# Patient Record
Sex: MALE | Race: WHITE | NOT HISPANIC OR LATINO | Employment: STUDENT | URBAN - NONMETROPOLITAN AREA
[De-identification: names, ages, dates, MRNs, and addresses within clinical notes are randomized per-mention and may not be internally consistent; named-entity substitution may affect disease eponyms.]

---
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
## 2025-01-28 ENCOUNTER — OFFICE VISIT (OUTPATIENT)
Dept: FAMILY MEDICINE | Facility: CLINIC | Age: 20
End: 2025-01-28
Payer: COMMERCIAL

## 2025-01-28 VITALS
SYSTOLIC BLOOD PRESSURE: 129 MMHG | BODY MASS INDEX: 29.54 KG/M2 | HEIGHT: 71 IN | DIASTOLIC BLOOD PRESSURE: 70 MMHG | RESPIRATION RATE: 20 BRPM | WEIGHT: 211 LBS | OXYGEN SATURATION: 97 % | HEART RATE: 76 BPM | TEMPERATURE: 99 F

## 2025-01-28 DIAGNOSIS — F41.1 GENERALIZED ANXIETY DISORDER: ICD-10-CM

## 2025-01-28 PROCEDURE — 1160F RVW MEDS BY RX/DR IN RCRD: CPT | Mod: CPTII,,, | Performed by: NURSE PRACTITIONER

## 2025-01-28 PROCEDURE — 3074F SYST BP LT 130 MM HG: CPT | Mod: CPTII,,, | Performed by: NURSE PRACTITIONER

## 2025-01-28 PROCEDURE — 99213 OFFICE O/P EST LOW 20 MIN: CPT | Mod: ,,, | Performed by: NURSE PRACTITIONER

## 2025-01-28 PROCEDURE — 3008F BODY MASS INDEX DOCD: CPT | Mod: CPTII,,, | Performed by: NURSE PRACTITIONER

## 2025-01-28 PROCEDURE — 3078F DIAST BP <80 MM HG: CPT | Mod: CPTII,,, | Performed by: NURSE PRACTITIONER

## 2025-01-28 PROCEDURE — 1159F MED LIST DOCD IN RCRD: CPT | Mod: CPTII,,, | Performed by: NURSE PRACTITIONER

## 2025-01-28 RX ORDER — CITALOPRAM 20 MG/1
20 TABLET, FILM COATED ORAL DAILY
Qty: 30 TABLET | Refills: 1 | Status: SHIPPED | OUTPATIENT
Start: 2025-01-28

## 2025-01-28 NOTE — PROGRESS NOTES
"Patient ID: Kodak Gentile  : 2005     Chief Complaint: Follow-up (On medications)    Allergies: Patient has No Known Allergies.     History of Present Illness:  The patient is a 19 y.o. White male who presents to clinic for evaluation and management with a chief complaint of Follow-up (On medications)   HPI   Patient complains of increased anxiety while on celexa in past 6 weeks. He was feeling medication working well until then when he started becoming more irritable and aggravated at home, work and school. He is attending school during day, then working from 4-10 5-6 days week.     Past Medical History:  has a past medical history of Obesity, unspecified.    Social History:  reports that he quit smoking about 15 months ago. His smoking use included cigarettes and vaping with nicotine. He started smoking about 3 months ago. He has never been exposed to tobacco smoke. He has never used smokeless tobacco. He reports that he does not drink alcohol and does not use drugs.    Care Team: Patient Care Team:  Sunitha Shah DNP as PCP - General (Family Medicine)     Current Medications:  Current Outpatient Medications   Medication Instructions    citalopram (CELEXA) 20 mg, Oral, Daily, Dose increase    hydrOXYzine (ATARAX) 50 mg, Oral, Nightly PRN       Review of Systems   Psychiatric/Behavioral:  Positive for agitation, dysphoric mood and sleep disturbance. Negative for behavioral problems, decreased concentration, depressed mood, self-injury and suicidal ideas. The patient is nervous/anxious.    All other systems reviewed and are negative.       Visit Vitals  /70 (BP Location: Left arm, Patient Position: Sitting)   Pulse 76   Temp 99.1 °F (37.3 °C)   Resp 20   Ht 5' 11" (1.803 m)   Wt 95.7 kg (211 lb)   SpO2 97%   BMI 29.43 kg/m²       Physical Exam  Vitals and nursing note reviewed.   Constitutional:       General: He is not in acute distress.     Appearance: He is not ill-appearing.   HENT:      Head: " "Normocephalic and atraumatic.      Mouth/Throat:      Mouth: Mucous membranes are moist.      Pharynx: Oropharynx is clear.   Eyes:      General: No scleral icterus.     Extraocular Movements: Extraocular movements intact.      Conjunctiva/sclera: Conjunctivae normal.      Pupils: Pupils are equal, round, and reactive to light.   Neck:      Vascular: No carotid bruit.   Cardiovascular:      Rate and Rhythm: Normal rate and regular rhythm.      Pulses: Normal pulses.      Heart sounds: Normal heart sounds. No murmur heard.     No friction rub. No gallop.   Pulmonary:      Effort: Pulmonary effort is normal. No respiratory distress.      Breath sounds: Normal breath sounds. No wheezing, rhonchi or rales.   Abdominal:      General: Abdomen is flat. Bowel sounds are normal. There is no distension.      Palpations: Abdomen is soft. There is no mass.      Tenderness: There is no abdominal tenderness.   Musculoskeletal:         General: Normal range of motion.      Cervical back: Normal range of motion and neck supple.   Skin:     General: Skin is warm and dry.   Neurological:      General: No focal deficit present.      Mental Status: He is alert and oriented to person, place, and time.   Psychiatric:         Mood and Affect: Mood normal.         Behavior: Behavior normal.         Thought Content: Thought content normal.         Judgment: Judgment normal.          Labs Reviewed:  Chemistry:  Lab Results   Component Value Date     06/03/2024    K 4.5 06/03/2024    BUN 19 06/03/2024    CREATININE 1.01 06/03/2024    EGFRNORACEVR 111 06/03/2024    CALCIUM 9.9 06/03/2024    ALBUMIN 4.7 06/03/2024    AST 15 06/03/2024    ALT 13 06/03/2024    TSH 4.170 06/03/2024        No results found for: "HGBA1C", "MICROALBCREA"     Hematology:  Lab Results   Component Value Date    WBC 5.4 06/03/2024    RBC 5.72 06/03/2024    HGB 18.4 (H) 06/03/2024    HCT 52.0 (H) 06/03/2024    MCV 91 06/03/2024    MCH 32.2 06/03/2024    MCHC 35.4 " 06/03/2024    RDW 11.9 06/03/2024     06/03/2024    MONO 9 06/03/2024    MONO 0.5 06/03/2024    BASO 0.1 06/03/2024    EOSINOPHIL 4 06/03/2024    BASOPHIL 1 06/03/2024       Lipid Panel:  Lab Results   Component Value Date    CHOL 182 (H) 06/03/2024    HDL 48 06/03/2024    LDLCALC 112 (H) 06/03/2024    TRIG 120 (H) 06/03/2024        Assessment & Plan:  1. Generalized anxiety disorder  -     citalopram (CELEXA) 20 MG tablet; Take 1 tablet (20 mg total) by mouth once daily. Dose increase  Dispense: 30 tablet; Refill: 1         Future Appointments   Date Time Provider Department Center   3/11/2025 10:40 AM Sunitha Shah DNP [facility] [place]   6/9/2025  8:00 AM Sunitha Shah DNP [facility] [place]       Follow up in about 6 weeks (around 3/11/2025). Call sooner if needed.    Sunitha Shah DNP

## 2025-03-12 ENCOUNTER — OFFICE VISIT (OUTPATIENT)
Dept: FAMILY MEDICINE | Facility: CLINIC | Age: 20
End: 2025-03-12
Payer: COMMERCIAL

## 2025-03-12 VITALS
HEIGHT: 70 IN | DIASTOLIC BLOOD PRESSURE: 72 MMHG | BODY MASS INDEX: 30.64 KG/M2 | RESPIRATION RATE: 20 BRPM | OXYGEN SATURATION: 100 % | SYSTOLIC BLOOD PRESSURE: 120 MMHG | TEMPERATURE: 98 F | WEIGHT: 214 LBS | HEART RATE: 88 BPM

## 2025-03-12 DIAGNOSIS — F41.8 DEPRESSION WITH ANXIETY: Primary | ICD-10-CM

## 2025-03-12 DIAGNOSIS — K58.1 IRRITABLE BOWEL SYNDROME WITH CONSTIPATION: ICD-10-CM

## 2025-03-12 PROBLEM — E66.811 OBESITY (BMI 30.0-34.9): Status: ACTIVE | Noted: 2024-06-04

## 2025-03-12 PROCEDURE — 99214 OFFICE O/P EST MOD 30 MIN: CPT | Mod: ,,, | Performed by: NURSE PRACTITIONER

## 2025-03-12 PROCEDURE — 3008F BODY MASS INDEX DOCD: CPT | Mod: CPTII,,, | Performed by: NURSE PRACTITIONER

## 2025-03-12 PROCEDURE — 1159F MED LIST DOCD IN RCRD: CPT | Mod: CPTII,,, | Performed by: NURSE PRACTITIONER

## 2025-03-12 PROCEDURE — 3074F SYST BP LT 130 MM HG: CPT | Mod: CPTII,,, | Performed by: NURSE PRACTITIONER

## 2025-03-12 PROCEDURE — 1160F RVW MEDS BY RX/DR IN RCRD: CPT | Mod: CPTII,,, | Performed by: NURSE PRACTITIONER

## 2025-03-12 PROCEDURE — 3078F DIAST BP <80 MM HG: CPT | Mod: CPTII,,, | Performed by: NURSE PRACTITIONER

## 2025-03-12 RX ORDER — LAMOTRIGINE 25(21)-50
1 KIT ORAL
Qty: 1 EACH | Refills: 1 | Status: SHIPPED | OUTPATIENT
Start: 2025-03-12

## 2025-03-12 RX ORDER — CITALOPRAM 20 MG/1
20 TABLET, FILM COATED ORAL DAILY
Qty: 30 TABLET | Refills: 1 | Status: SHIPPED | OUTPATIENT
Start: 2025-03-12

## 2025-03-12 NOTE — ASSESSMENT & PLAN NOTE
Celexa 20 mg daily but feeling more sadness and introverted. Sleeping 8 hours without hydroxyzine. Still with anxiety in school, working and alone at home, recurring worries and feeling more introverted. More irritable and girlfriend noted more withdrawn.

## 2025-03-12 NOTE — ASSESSMENT & PLAN NOTE
Patient has seen GI specialist and informed IBS with constipation. Not currently experiencing symptoms

## 2025-03-12 NOTE — PROGRESS NOTES
Patient ID: Kodak Gentile  : 2005     Chief Complaint: Follow-up (Patient presents to clinic for 6 week follow up on medicine. Patient reports celexa is working well but the hydroxyzine is causing negative side effects. )    Allergies: Patient has no known allergies.     History of Present Illness:  The patient is a 19 y.o. White male who presents to clinic for evaluation and management with a chief complaint of Follow-up (Patient presents to clinic for 6 week follow up on medicine. Patient reports celexa is working well but the hydroxyzine is causing negative side effects. )   History of Present Illness    CHIEF COMPLAINT:  Patient presents today for follow-up on anxiety and depression.    ANXIETY AND DEPRESSION:  He reports significant anxiety, particularly during school and work situations. He experiences ticking and intrusive thoughts when alone, accompanied by severe muscle tension. He denies panic attacks. He reports daily sadness beginning upon waking and persisting throughout the day and night, acknowledging current feelings of sadness. His girlfriend has noticed he becomes less talkative during mood changes, and he has become more withdrawn and introspective during these periods.    SLEEP:  He experiences sleep paralysis, describing an inability to move arms and legs upon waking, accompanied by chest discomfort. His sleep schedule is from 11 PM-12 AM to 7 AM, achieving approximately 8 hours of sleep per night without sleep medication.    MEDICATIONS:  He currently takes Celexa and a men's daily vitamin. He has discontinued sleep medication.    GI:  Previous constipation has improved.    SOCIAL HISTORY:  He is not currently exercising due to work demands.      ROS:  General: -fever, -chills, -fatigue, -weight gain, -weight loss  Eyes: -vision changes, -redness, -discharge  ENT: -ear pain, -nasal congestion, -sore throat  Cardiovascular: +chest pain, -palpitations, -lower extremity  "edema  Respiratory: -cough, -shortness of breath  Gastrointestinal: -abdominal pain, -nausea, -vomiting, -diarrhea, -constipation, -blood in stool  Genitourinary: -dysuria, -hematuria, -frequency  Musculoskeletal: -joint pain, -muscle pain  Skin: -rash, -lesion  Neurological: -headache, -dizziness, -numbness, -tingling  Psychiatric: +anxiety, -depression, -sleep difficulty, -panic attacks, +mood swings          Past Medical History:  has a past medical history of Obesity, unspecified.    Social History:  reports that he quit smoking about 17 months ago. His smoking use included cigarettes and vaping with nicotine. He started smoking about 5 months ago. He has never been exposed to tobacco smoke. He has never used smokeless tobacco. He reports that he does not drink alcohol and does not use drugs.    Care Team: Patient Care Team:  Sunitha Shah DNP as PCP - General (Family Medicine)     Current Medications:  Current Outpatient Medications   Medication Instructions    citalopram (CELEXA) 20 mg, Oral, Daily, Dose increase    lamoTRIgine (LAMICTAL XR STARTER, BLUE,) 25 mg (21) -50 mg (7) TERD 1 tablet, Oral, Daily before evening meal, Take 25 daily for 3 weeks then increase to 50 mg.       Review of Systems   Constitutional:  Positive for activity change. Negative for appetite change and fatigue.   Musculoskeletal: Negative.    Psychiatric/Behavioral:  Positive for depressed mood. Negative for agitation, behavioral problems, decreased concentration, hallucinations, self-injury, sleep disturbance and suicidal ideas. The patient is nervous/anxious. The patient is not hyperactive.    All other systems reviewed and are negative.       Visit Vitals  /72 (BP Location: Right arm, Patient Position: Sitting)   Pulse 88   Temp 98 °F (36.7 °C)   Resp 20   Ht 5' 10" (1.778 m)   Wt 97.1 kg (214 lb)   SpO2 100%   BMI 30.71 kg/m²       Physical Exam  Vitals and nursing note reviewed.   Constitutional:       General: He is not " "in acute distress.     Appearance: He is obese. He is not ill-appearing.   HENT:      Head: Normocephalic and atraumatic.      Nose: Nose normal.      Mouth/Throat:      Mouth: Mucous membranes are moist.      Pharynx: Oropharynx is clear.   Eyes:      General: No scleral icterus.     Extraocular Movements: Extraocular movements intact.      Conjunctiva/sclera: Conjunctivae normal.      Pupils: Pupils are equal, round, and reactive to light.   Neck:      Vascular: No carotid bruit.   Cardiovascular:      Rate and Rhythm: Normal rate and regular rhythm.      Heart sounds: No murmur heard.     No friction rub. No gallop.   Pulmonary:      Effort: Pulmonary effort is normal. No respiratory distress.      Breath sounds: Normal breath sounds. No wheezing, rhonchi or rales.   Abdominal:      General: Abdomen is flat. Bowel sounds are normal. There is no distension.      Palpations: Abdomen is soft. There is no mass.      Tenderness: There is no abdominal tenderness.   Musculoskeletal:         General: Normal range of motion.      Cervical back: Normal range of motion and neck supple.   Skin:     General: Skin is warm and dry.   Neurological:      General: No focal deficit present.      Mental Status: He is alert and oriented to person, place, and time.   Psychiatric:         Mood and Affect: Mood normal.         Behavior: Behavior normal.         Thought Content: Thought content normal.         Judgment: Judgment normal.          Labs Reviewed:  Chemistry:  Lab Results   Component Value Date     06/03/2024    K 4.5 06/03/2024    BUN 19 06/03/2024    CREATININE 1.01 06/03/2024    EGFRNORACEVR 111 06/03/2024    CALCIUM 9.9 06/03/2024    ALBUMIN 4.7 06/03/2024    AST 15 06/03/2024    ALT 13 06/03/2024    TSH 4.170 06/03/2024        No results found for: "HGBA1C", "MICROALBCREA"     Hematology:  Lab Results   Component Value Date    WBC 5.4 06/03/2024    RBC 5.72 06/03/2024    HGB 18.4 (H) 06/03/2024    HCT 52.0 (H) " 06/03/2024    MCV 91 06/03/2024    MCH 32.2 06/03/2024    MCHC 35.4 06/03/2024    RDW 11.9 06/03/2024     06/03/2024    MONO 9 06/03/2024    MONO 0.5 06/03/2024    BASO 0.1 06/03/2024    EOSINOPHIL 4 06/03/2024    BASOPHIL 1 06/03/2024       Lipid Panel:  Lab Results   Component Value Date    CHOL 182 (H) 06/03/2024    HDL 48 06/03/2024    LDLCALC 112 (H) 06/03/2024    TRIG 120 (H) 06/03/2024        Assessment & Plan:  1. Depression with anxiety  Assessment & Plan:  Celexa 20 mg daily but feeling more sadness and introverted. Sleeping 8 hours without hydroxyzine. Still with anxiety in school, working and alone at home, recurring worries and feeling more introverted. More irritable and girlfriend noted more withdrawn.     Orders:  -     lamoTRIgine (LAMICTAL XR STARTER, BLUE,) 25 mg (21) -50 mg (7) TERD; Take 1 tablet by mouth before evening meal. Take 25 daily for 3 weeks then increase to 50 mg.  Dispense: 1 each; Refill: 1  -     citalopram (CELEXA) 20 MG tablet; Take 1 tablet (20 mg total) by mouth once daily. Dose increase  Dispense: 30 tablet; Refill: 1    2. Irritable bowel syndrome with constipation  Assessment & Plan:  Patient has seen GI specialist and informed IBS with constipation. Not currently experiencing symptoms            Assessment & Plan    F41.1 Generalized anxiety disorder  K58.1 Irritable bowel syndrome with constipation    IMPRESSION:  - Assessed symptoms of anxiety, depression, and sleep disturbances  - Evaluated need for medication adjustment due to persistent anxiety and emerging depressive symptoms  - Considered adding an anti-seizure medication to stabilize mood and reduce anxiety  - Ruled out serious interactions with current supplements (men's daily vitamin)    F41.1 GENERALIZED ANXIETY DISORDER:  - Explained the protective mechanism of sleep paralysis during vivid dreams and discussed the common co-occurrence of anxiety and depression.  - Continued Celexa at current dose but  discontinued sleep medication due to excessive drowsiness and potential sleep paralysis side effect.  - Administered anxiety and depression questionnaires to assess current symptoms and guide treatment.  - Follow up to complete questionnaires to help determine appropriate medication adjustments.  - Informed about and recommended considering counseling options, including teletherapy and televisit formats, for future follow-up.          Future Appointments   Date Time Provider Department Center   4/2/2025  2:00 PM Sunitha Shah DNP [facility] [place]   6/9/2025  8:00 AM Sunitha Shah DNP [facility] [place]       Follow up in about 3 weeks (around 4/2/2025). Call sooner if needed.      This note was generated with the assistance of ambient listening technology. Verbal consent was obtained by the patient and accompanying visitor(s) for the recording of patient appointment to facilitate this note. I attest to having reviewed and edited the generated note for accuracy, though some syntax or spelling errors may persist. Please contact the author of this note for any clarification.      Sunitha Shah DNP

## 2025-03-12 NOTE — PROGRESS NOTES
"Patient ID: Kodak Gentile  : 2005     Chief Complaint: Follow-up (Patient presents to clinic for 6 week follow up on medicine. )    Allergies: Patient has no known allergies.     History of Present Illness:  The patient is a 19 y.o. White male who presents to clinic for evaluation and management with a chief complaint of Follow-up (Patient presents to clinic for 6 week follow up on medicine. )   History of Present Illness               Past Medical History:  has a past medical history of Obesity, unspecified.    Social History:  reports that he quit smoking about 17 months ago. His smoking use included cigarettes and vaping with nicotine. He started smoking about 5 months ago. He has never been exposed to tobacco smoke. He has never used smokeless tobacco. He reports that he does not drink alcohol and does not use drugs.    Care Team: Patient Care Team:  Sunitha Shah DNP as PCP - General (Family Medicine)     Current Medications:  Current Outpatient Medications   Medication Instructions    citalopram (CELEXA) 20 mg, Oral, Daily, Dose increase    hydrOXYzine (ATARAX) 50 mg, Oral, Nightly PRN       Review of Systems     There were no vitals taken for this visit.    Physical Exam     Labs Reviewed:  Chemistry:  Lab Results   Component Value Date     2024    K 4.5 2024    BUN 19 2024    CREATININE 1.01 2024    EGFRNORACEVR 111 2024    CALCIUM 9.9 2024    ALBUMIN 4.7 2024    AST 15 2024    ALT 13 2024    TSH 4.170 2024        No results found for: "HGBA1C", "MICROALBCREA"     Hematology:  Lab Results   Component Value Date    WBC 5.4 2024    RBC 5.72 2024    HGB 18.4 (H) 2024    HCT 52.0 (H) 2024    MCV 91 2024    MCH 32.2 2024    MCHC 35.4 2024    RDW 11.9 2024     2024    MONO 9 2024    MONO 0.5 2024    BASO 0.1 2024    EOSINOPHIL 4 2024    BASOPHIL 1 " 06/03/2024       Lipid Panel:  Lab Results   Component Value Date    CHOL 182 (H) 06/03/2024    HDL 48 06/03/2024    LDLCALC 112 (H) 06/03/2024    TRIG 120 (H) 06/03/2024        Assessment & Plan:  1. Generalized anxiety disorder  Assessment & Plan:  Celexa 20 mg daily hydroxyzine 50 mg qhs prn. The current medical regimen is effective;  continue present plan and medications. Continue to exercise continue current medication and follow-up in 3 months unless any changes problems or alterations occur       2. Irritable bowel syndrome with constipation  Assessment & Plan:  Patient has seen GI specialist and informed IBS with constipation. Not currently experiencing symptoms            Assessment & Plan               Future Appointments   Date Time Provider Department Center   6/9/2025  8:00 AM Sunitha Shah DNP [facility] [place]       No follow-ups on file. Call sooner if needed.      This note was generated with the assistance of ambient listening technology. Verbal consent was obtained by the patient and accompanying visitor(s) for the recording of patient appointment to facilitate this note. I attest to having reviewed and edited the generated note for accuracy, though some syntax or spelling errors may persist. Please contact the author of this note for any clarification.      Lissy Novak MA